# Patient Record
Sex: FEMALE | Race: BLACK OR AFRICAN AMERICAN | Employment: FULL TIME | URBAN - METROPOLITAN AREA
[De-identification: names, ages, dates, MRNs, and addresses within clinical notes are randomized per-mention and may not be internally consistent; named-entity substitution may affect disease eponyms.]

---

## 2018-05-09 ENCOUNTER — HOSPITAL ENCOUNTER (EMERGENCY)
Age: 33
Discharge: HOME | End: 2018-05-09
Attending: EMERGENCY MEDICINE
Payer: COMMERCIAL

## 2018-05-09 VITALS
OXYGEN SATURATION: 99 % | SYSTOLIC BLOOD PRESSURE: 141 MMHG | HEART RATE: 82 BPM | TEMPERATURE: 98.1 F | RESPIRATION RATE: 16 BRPM | HEIGHT: 61 IN | DIASTOLIC BLOOD PRESSURE: 82 MMHG

## 2018-05-09 DIAGNOSIS — T75.3XXA MOTION SICKNESS, INITIAL ENCOUNTER: ICD-10-CM

## 2018-05-09 DIAGNOSIS — H92.01 EARACHE, RIGHT: Primary | ICD-10-CM

## 2018-05-09 PROCEDURE — 99282 EMERGENCY DEPT VISIT SF MDM: CPT

## 2018-05-09 RX ORDER — KETOROLAC TROMETHAMINE 10 MG/1
10 TABLET, FILM COATED ORAL EVERY 8 HOURS
Qty: 15 TAB | Refills: 0 | Status: SHIPPED | OUTPATIENT
Start: 2018-05-09 | End: 2018-05-14

## 2018-05-10 NOTE — DISCHARGE INSTRUCTIONS
Earache: Care Instructions  Your Care Instructions    Even though infection is a common cause of ear pain, not all ear pain means an infection. If you have ear pain and don't have an infection, it could be because of a jaw problem, such as temporomandibular joint (TMJ) pain. Or it could be because of a neck problem. When ear discomfort or pain is mild or comes and goes without other symptoms, home treatment may be all you need. Follow-up care is a key part of your treatment and safety. Be sure to make and go to all appointments, and call your doctor if you are having problems. It's also a good idea to know your test results and keep a list of the medicines you take. How can you care for yourself at home? · Apply heat on the ear to ease pain. To apply heat, put a warm water bottle, a heating pad set on low, or a warm cloth on your ear. Do not go to sleep with a heating pad on your skin. · Take an over-the-counter pain medicine, such as acetaminophen (Tylenol), ibuprofen (Advil, Motrin), or naproxen (Aleve). Be safe with medicines. Read and follow all instructions on the label. · Do not take two or more pain medicines at the same time unless the doctor told you to. Many pain medicines have acetaminophen, which is Tylenol. Too much acetaminophen (Tylenol) can be harmful. · Never insert anything, such as a cotton swab or a cady pin, into the ear. When should you call for help? Call your doctor now or seek immediate medical care if:  ? · You have new or worse symptoms of infection, such as:  ¨ Increased pain, swelling, warmth, or redness. ¨ Red streaks leading from the area. ¨ Pus draining from the area. ¨ A fever. ? Watch closely for changes in your health, and be sure to contact your doctor if:  ? · You have new or worse discharge coming from the ear. ? · You do not get better as expected. Where can you learn more? Go to http://ladonna-anjali.info/.   Enter Y909 in the search box to learn more about \"Earache: Care Instructions. \"  Current as of: May 12, 2017  Content Version: 11.4  © 0910-5765 [employer]. Care instructions adapted under license by [employer] (which disclaims liability or warranty for this information). If you have questions about a medical condition or this instruction, always ask your healthcare professional. [facility] any warranty or liability for your use of this information. Motion Sickness: Care Instructions  Your Care Instructions    Motion sickness is nausea caused by riding in a car, airplane, train, or boat. It can also cause vomiting, sweating, and headache. Motion sickness is sometimes called carsickness, airsickness, or seasickness. You can also get motion sickness from playing video games, looking through a microscope, or other activities. Problems caused by motion sickness usually go away soon after the motion stops. Sometimes it can take a few days for symptoms to go away. Motion sickness can be treated with either over-the-counter or prescription medicine. The medicines come as pills, a patch, or a shot. Some people try ginger or ginger ale to help nausea. Some people also think wristbands that put pressure on a certain spot can reduce motion sickness. Follow-up care is a key part of your treatment and safety. Be sure to make and go to all appointments, and call your doctor if you are having problems. It's also a good idea to know your test results and keep a list of the medicines you take. How can you care for yourself at home? · Sit in the front seat of a car or near the wings when you fly in an airplane. · Try not to move your head. Keep your head still by pressing it into a headrest.  · On a boat, get a cabin near the middle of the ship. Go outside often to get fresh air. · When in a car, boat, or airplane, look at one place on the horizon.   · Do not read or watch TV in a moving vehicle. · Do not drink alcohol or eat a big meal before traveling. · Eat small meals during long trips. · Try a few soda crackers and a carbonated drink if you feel ill. · Try ginger, ginger tea, or ginger ale before you travel. · Try an over-the-counter medicine, such as dimenhydrinate (Dramamine), diphenhydramine (Benadryl), or meclizine (Bonine), about an hour before you travel. These medicines can make you feel sleepy. Do not drive while using them. · If you get prescription medicine from your doctor, take your medicines exactly as prescribed. Be aware that these medicines may make you sleepy. Call your doctor if you think you are having a problem with your medicine. When should you call for help? Call your doctor now or seek immediate medical care if:  ? · You have nausea and vomiting that does not go away after treatment. ? Watch closely for changes in your health, and be sure to contact your doctor if:  ? · Your symptoms do not go away within 3 days after a trip. ? · You do not get better as expected. Where can you learn more? Go to http://ladonna-anjali.info/. Enter N614 in the search box to learn more about \"Motion Sickness: Care Instructions. \"  Current as of: May 12, 2017  Content Version: 11.4  © 9339-9399 [employer]. Care instructions adapted under license by [employer] (which disclaims liability or warranty for this information). If you have questions about a medical condition or this instruction, always ask your healthcare professional. [place] [address] any warranty or liability for your use of this information.

## 2018-05-10 NOTE — ED PROVIDER NOTES
EMERGENCY DEPARTMENT HISTORY AND PHYSICAL EXAM    Date: 5/9/2018  Patient Name: Shanti Cervantes    History of Presenting Illness     Chief Complaint   Patient presents with    Ear Pain    Sore Throat       History Provided By: Patient    Chief Complaint: ear pain  Duration: 1 Days  Timing:  Acute  Location: right ear  Quality: Aching  Severity: 7 out of 10  Modifying Factors: no relief with Motrin or DayQuil  Associated Symptoms: decreased hearing and pain in the bilateral neck (lymph nodes)    Additional History (Context):   11:30 PM  Shanti Cervantes is a 29 y.o. female who presents to the emergency department C/O right ear pain onset 1 day. Associated sxs include decreased hearing and pain in the bilateral neck (lymph nodes). Pt reports returning from a flight from [place]. She felt popping in her ears, but when she got to the ground her ear pain did not stop. She took four 200 mg Motrin, and 1 DayQuil which didn't provide relief. Pt denies sore throat, SOB, and any other sxs or complaints. PCP: Chadd Sexton MD    Current Outpatient Prescriptions   Medication Sig Dispense Refill    ketorolac (TORADOL) 10 mg tablet Take 1 Tab by mouth every eight (8) hours for 5 days. 15 Tab 0    fluticasone (FLONASE) 50 mcg/actuation nasal spray nightly.  AMOXICILLIN/POTASSIUM CLAV (AUGMENTIN PO) Take  by mouth.  albuterol (PROVENTIL HFA, VENTOLIN HFA, PROAIR HFA) 90 mcg/actuation inhaler Take 2 puffs by inhalation every four (4) hours as needed for Wheezing. 1 Inhaler 0    inhalational spacing device 1 each by Does Not Apply route as needed. 1 Device 0       Past History     Past Medical History:  History reviewed. No pertinent past medical history. Past Surgical History:  Past Surgical History:   Procedure Laterality Date    HX APPENDECTOMY         Family History:  History reviewed. No pertinent family history.     Social History:  Social History   Substance Use Topics    Smoking status: Former Smoker    Smokeless tobacco: Never Used    Alcohol use No       Allergies:  No Known Allergies    Review of Systems   Review of Systems   HENT: Positive for ear pain (right) and hearing loss. Negative for sore throat. Respiratory: Negative for shortness of breath. Musculoskeletal: Positive for neck pain (bilateral, lymph nodes). All other systems reviewed and are negative. Physical Exam     Vitals:    05/09/18 2308   BP: (!) 152/107   Pulse: 89   Resp: 20   Temp: 98.1 °F (36.7 °C)   SpO2: 100%   Height: 5' 1\" (1.549 m)     Physical Exam   Constitutional: She is oriented to person, place, and time. She appears well-developed and well-nourished. No distress. HENT:   Head: Normocephalic and atraumatic. Right Ear: External ear normal.   Left Ear: External ear normal.   Nose: Nose normal.   Mouth/Throat: Oropharynx is clear and moist. No oropharyngeal exudate. Eyes: Conjunctivae and EOM are normal. Pupils are equal, round, and reactive to light. Right eye exhibits no discharge. Left eye exhibits no discharge. No scleral icterus. Neck: Normal range of motion. Neck supple. No JVD present. No tracheal deviation present. No thyromegaly present. Cardiovascular: Normal rate, regular rhythm, S1 normal, S2 normal and normal heart sounds. Exam reveals no gallop and no friction rub. No murmur heard. Pulmonary/Chest: Breath sounds normal. No stridor. No respiratory distress. She has no wheezes. She has no rales. She exhibits no tenderness. Abdominal: Soft. She exhibits no distension and no mass. There is no tenderness. There is no rebound and no guarding. Musculoskeletal: Normal range of motion. She exhibits no edema, tenderness or deformity. Lymphadenopathy:     She has no cervical adenopathy. Neurological: She is alert and oriented to person, place, and time. She displays normal reflexes. No cranial nerve deficit. She exhibits normal muscle tone. Coordination normal.   Skin: No rash noted. She is not diaphoretic. No pallor. Psychiatric: She has a normal mood and affect. Her behavior is normal. Thought content normal.   Nursing note and vitals reviewed. Diagnostic Study Results     Labs -   No results found for this or any previous visit (from the past 12 hour(s)). Radiologic Studies -   No orders to display     CT Results  (Last 48 hours)    None        CXR Results  (Last 48 hours)    None          Medications given in the ED-  Medications - No data to display    Medical Decision Making   I am the first provider for this patient. I reviewed the vital signs, available nursing notes, past medical history, past surgical history, family history and social history. Vital Signs-Reviewed the patient's vital signs. Pulse Oximetry Analysis - 100% on RA     Records Reviewed: Nursing Notes    Provider Notes (Medical Decision Making):     Procedures:  Procedures    ED Course:   11:30 PM Initial assessment performed. The patients presenting problems have been discussed, and they are in agreement with the care plan formulated and outlined with them. I have encouraged them to ask questions as they arise throughout their visit. Diagnosis and Disposition       DISCHARGE NOTE:  11:39 PM  Jewel Del Rio's results have been reviewed with her. She has been counseled regarding her diagnosis, treatment, and plan. She verbally conveys understanding and agreement of the signs, symptoms, diagnosis, treatment and prognosis and additionally agrees to follow up as discussed. She also agrees with the care-plan and conveys that all of her questions have been answered. I have also provided discharge instructions for her that include: educational information regarding their diagnosis and treatment, and list of reasons why they would want to return to the ED prior to their follow-up appointment, should her condition change. She has been provided with education for proper emergency department utilization. CLINICAL IMPRESSION:    1. Earache, right    2. Motion sickness, initial encounter        PLAN:  1. D/C Home  2. Current Discharge Medication List      START taking these medications    Details   ketorolac (TORADOL) 10 mg tablet Take 1 Tab by mouth every eight (8) hours for 5 days. Qty: 15 Tab, Refills: 0           3. Follow-up Information     Follow up With Details Comments Contact Info    Chadd Sexton MD Schedule an appointment as soon as possible for a visit in 2 days For primary care follow [facility] [address] [facility] [address] [facility] EMERGENCY DEPT Go to As needed, If symptoms worsen [address] [address]  [phone]        _______________________________    Attestations: This note is prepared by Devang Novak, acting as Scribe for Jr Lugo MD.    Jr Lugo MD: The scribe's documentation has been prepared under my direction and personally reviewed by me in its entirety.  I confirm that the note above accurately reflects all work, treatment, procedures, and medical decision making performed by me.  _______________________________

## 2018-05-10 NOTE — ED TRIAGE NOTES
Presented to ED to be evaluated for reported bilateral ear pain and sore         Sepsis Screening completed    (  )Patient meets SIRS criteria. ( x )Patient does not meet SIRS criteria.       SIRS Criteria is achieved when two or more of the following are present   Temperature < 96.8°F (36°C) or > 100.9°F (38.3°C)   Heart Rate > 90 beats per minute   Respiratory Rate > 20 breaths per minute   WBC count > 12,000 or <4,000 or > 10% bands